# Patient Record
Sex: FEMALE | Race: WHITE | URBAN - METROPOLITAN AREA
[De-identification: names, ages, dates, MRNs, and addresses within clinical notes are randomized per-mention and may not be internally consistent; named-entity substitution may affect disease eponyms.]

---

## 2020-09-08 VITALS
HEIGHT: 66 IN | WEIGHT: 176.4 LBS | BODY MASS INDEX: 28.35 KG/M2 | SYSTOLIC BLOOD PRESSURE: 121 MMHG | DIASTOLIC BLOOD PRESSURE: 83 MMHG | HEART RATE: 104 BPM

## 2020-09-08 PROBLEM — F31.9 BIPOLAR DISORDER (HCC): Status: ACTIVE | Noted: 2020-06-10

## 2020-09-08 PROBLEM — N76.0 BACTERIAL VAGINOSIS: Status: ACTIVE | Noted: 2020-06-17

## 2020-09-08 PROBLEM — F32.A DEPRESSIVE DISORDER: Status: ACTIVE | Noted: 2020-06-10

## 2020-09-08 PROBLEM — B96.89 BACTERIAL VAGINOSIS: Status: ACTIVE | Noted: 2020-06-17

## 2020-09-08 PROBLEM — B00.9 HERPES SIMPLEX: Status: ACTIVE | Noted: 2020-06-15

## 2020-09-08 RX ORDER — VALACYCLOVIR HYDROCHLORIDE 500 MG/1
TABLET, FILM COATED ORAL 2 TIMES DAILY
COMMUNITY

## 2020-09-08 RX ORDER — AZITHROMYCIN 250 MG/1
250 TABLET, FILM COATED ORAL DAILY
COMMUNITY

## 2020-09-08 RX ORDER — FLUCONAZOLE 150 MG/1
150 TABLET ORAL DAILY
COMMUNITY

## 2020-09-08 RX ORDER — METRONIDAZOLE 500 MG/1
TABLET ORAL 3 TIMES DAILY
COMMUNITY

## 2021-02-11 ENCOUNTER — TELEPHONE (OUTPATIENT)
Dept: OBGYN CLINIC | Age: 46
End: 2021-02-11

## 2021-02-11 DIAGNOSIS — N34.1 NONGONOCOCCAL URETHRITIS DUE TO UREAPLASMA UREALYTICUM: Primary | ICD-10-CM

## 2021-02-11 DIAGNOSIS — A49.3 NONGONOCOCCAL URETHRITIS DUE TO UREAPLASMA UREALYTICUM: Primary | ICD-10-CM

## 2021-02-11 RX ORDER — DOXYCYCLINE 100 MG/1
100 CAPSULE ORAL 2 TIMES DAILY
Qty: 14 CAP | Refills: 0 | Status: SHIPPED | OUTPATIENT
Start: 2021-02-11 | End: 2021-02-18

## 2021-02-11 NOTE — TELEPHONE ENCOUNTER
I returned patient's call in regards to being treated for Ureaplasma as her boyfriend tested positive. I will send in doxycycline to [facility] in [place] as requested. Patient does not need an appointment unless she becomes symptomatic.

## 2021-02-11 NOTE — TELEPHONE ENCOUNTER
Returned patient's call regarding her request for an antibiotic as her boyfriend is currently being treated with Doxycycline for Ureaplasma. A message was left on the patient's voicemail letting her know her message has been forwarded to Dr Fazal Barker.

## 2021-02-12 NOTE — TELEPHONE ENCOUNTER
Spoke with patient and she was aware the prescription had been sent as she states she had spoken with Dr Ravinder Castaneda. She states she was also advised by her to cancel the appt scheduled for 02/15/21. Appt cancelled.

## 2021-02-25 ENCOUNTER — TELEPHONE (OUTPATIENT)
Dept: ORTHOPEDIC SURGERY | Age: 46
End: 2021-02-25

## 2021-02-25 NOTE — TELEPHONE ENCOUNTER
Called patient to schedule New patient appt as requested per referral. No answer. VM is full. If patient calls back please schedule appointment with Dr. Humble Chávez.